# Patient Record
Sex: MALE | Race: WHITE | NOT HISPANIC OR LATINO
[De-identification: names, ages, dates, MRNs, and addresses within clinical notes are randomized per-mention and may not be internally consistent; named-entity substitution may affect disease eponyms.]

---

## 2019-11-11 ENCOUNTER — APPOINTMENT (OUTPATIENT)
Dept: ORTHOPEDIC SURGERY | Facility: CLINIC | Age: 24
End: 2019-11-11
Payer: COMMERCIAL

## 2019-11-11 VITALS
HEIGHT: 70 IN | HEART RATE: 88 BPM | DIASTOLIC BLOOD PRESSURE: 82 MMHG | SYSTOLIC BLOOD PRESSURE: 127 MMHG | BODY MASS INDEX: 31.21 KG/M2 | WEIGHT: 218 LBS

## 2019-11-11 DIAGNOSIS — F17.200 NICOTINE DEPENDENCE, UNSPECIFIED, UNCOMPLICATED: ICD-10-CM

## 2019-11-11 DIAGNOSIS — Z78.9 OTHER SPECIFIED HEALTH STATUS: ICD-10-CM

## 2019-11-11 PROBLEM — Z00.00 ENCOUNTER FOR PREVENTIVE HEALTH EXAMINATION: Status: ACTIVE | Noted: 2019-11-11

## 2019-11-11 PROCEDURE — 29125 APPL SHORT ARM SPLINT STATIC: CPT | Mod: LT

## 2019-11-11 PROCEDURE — 99203 OFFICE O/P NEW LOW 30 MIN: CPT | Mod: 25

## 2019-11-11 PROCEDURE — 73110 X-RAY EXAM OF WRIST: CPT | Mod: LT

## 2019-11-11 NOTE — DISCUSSION/SUMMARY
[FreeTextEntry1] : He has findings consistent with an acute left scaphoid waist fracture.  There is no obvious displacement on radiographs.\par \par I had a discussion regarding today's visit, the diagnosis, and treatment recommendations / options.  I discussed the inherent nature of scaphoid fractures with him and the risk of nonunion and avascular necrosis.  He is a smoker.  I discussed the benefits of surgery versus immobilization with a splint.  Unless absolutely necessary, he would like to avoid surgery.  He would like to try to see if this will heal in a cast.  He was placed into a fiberglass thumb spica splint, as he has too much swelling to mobilize him in a circular cast.  He was instructed on elevation and activity modification.  He will follow-up in 1 week for repeat x-rays and conversion to a cast.\par \par The patient has agreed to this plan of management and has expressed full understanding.  All questions were fully answered to the patient's satisfaction.\par \par Over 50% of the time spent with the patient was on counseling the patient on the above diagnosis, treatment plan and prognosis.
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
## 2019-11-11 NOTE — REVIEW OF SYSTEMS
[Right] : right [Negative] : Allergic/Immunologic [de-identified] : Complains of numbness and tingling in his left hand.

## 2019-11-11 NOTE — PHYSICAL EXAM
[de-identified] : PA, lateral, oblique and PA ulnar deviation views of his left wrist demonstrate a scaphoid waist fracture.  There is no displacement at the articular surface. [de-identified] : - Constitutional: This is a male in no obvious distress.  \par - Psych: Patient is alert and oriented to person, place and time.  Patient has a normal mood and affect.\par - Cardiovascular: Normal pulses throughout the upper extremities.  No significant varicosities are noted in the upper extremities. \par - Neuro: Strength and sensation are intact throughout the upper extremities.  Patient has normal coordination.\par - Respiratory:  Patient exhibits no evidence of shortness of breath or difficulty breathing.\par - Skin: No rashes, lesions, or other abnormalities are noted in the upper extremities.\par \par ---\par \par Examination of his left wrist and hand demonstrates diffuse swelling of the hand.  There is less notable swelling of the wrist.  He is diffusely tender with mild tenderness along the snuffbox.  He has limited motion of the wrist and digits.  He has complaints of numbness and tingling throughout the hand.  His fingers are warm to the touch.

## 2019-11-18 ENCOUNTER — APPOINTMENT (OUTPATIENT)
Dept: ORTHOPEDIC SURGERY | Facility: CLINIC | Age: 24
End: 2019-11-18
Payer: COMMERCIAL

## 2019-11-18 VITALS — BODY MASS INDEX: 31.21 KG/M2 | HEIGHT: 70 IN | WEIGHT: 218 LBS

## 2019-11-18 PROCEDURE — 29075 APPL CST ELBW FNGR SHORT ARM: CPT | Mod: LT

## 2019-11-18 PROCEDURE — 99214 OFFICE O/P EST MOD 30 MIN: CPT | Mod: 25

## 2019-11-18 PROCEDURE — 73110 X-RAY EXAM OF WRIST: CPT | Mod: LT

## 2019-11-18 NOTE — PHYSICAL EXAM
[de-identified] : - Constitutional: This is a male in no obvious distress.  \par - Psych: Patient is alert and oriented to person, place and time.  Patient has a normal mood and affect.\par - Cardiovascular: Normal pulses throughout the upper extremities.  No significant varicosities are noted in the upper extremities. \par - Neuro: Strength and sensation are intact throughout the upper extremities.  Patient has normal coordination.\par - Respiratory:  Patient exhibits no evidence of shortness of breath or difficulty breathing.\par - Skin: No rashes, lesions, or other abnormalities are noted in the upper extremities.\par \par ---\par \par Examination of his left wrist and hand after the splint was removed demonstrates decreased swelling.  He has tenderness along the snuffbox.  He has improved flexion and extension of the digits.  He is neurovascular intact distally today. [de-identified] : PA, lateral, oblique and PA ulnar deviation views of his left wrist demonstrate his scaphoid waist fracture to be well aligned, without interval displacement at the articular surface.

## 2019-11-18 NOTE — PROCEDURE
[FreeTextEntry1] : He was placed into a well-padded well molded left short arm fiberglass thumb spica cast.  He was instructed on cast care and activity modification.  He will follow-up after the CAT scan to review the results.

## 2019-11-18 NOTE — DISCUSSION/SUMMARY
[FreeTextEntry1] : I had a discussion regarding today's visit, the diagnosis, and my treatment recommendations.  Further treatment recommendations / options were discussed.  Again, he would like to avoid surgery unless absolutely necessary.  At this time, he has agreed to be placed in a cast.  I also recommended obtaining a CAT scan to confirm that the fracture is not displaced.  He is a smoker, and if there is any displacement, then I would recommend ORIF.  He will follow-up next week after the CAT scan to review the results and discuss definitive treatment recommendations.\par \par The patient has agreed to the above plan of management and has expressed full understanding.  All questions were fully answered to the patient's satisfaction.\par \par I spent at least 25 minutes of face-to-face time with the patient.  Over 50% of this time was spent on counseling the patient on the above diagnosis, treatment plan and prognosis.

## 2019-11-18 NOTE — REVIEW OF SYSTEMS
[Right] : right [Negative] : Allergic/Immunologic [de-identified] : Complains of numbness and tingling in his left hand.

## 2019-11-18 NOTE — HISTORY OF PRESENT ILLNESS
[FreeTextEntry1] : 9 days status post left scaphoid waist fracture.\par \par He is doing well and his swelling and pain are improved.

## 2019-11-25 ENCOUNTER — APPOINTMENT (OUTPATIENT)
Dept: ORTHOPEDIC SURGERY | Facility: CLINIC | Age: 24
End: 2019-11-25
Payer: COMMERCIAL

## 2019-11-25 VITALS — HEIGHT: 70 IN | WEIGHT: 218 LBS | BODY MASS INDEX: 31.21 KG/M2

## 2019-11-25 PROCEDURE — 99214 OFFICE O/P EST MOD 30 MIN: CPT

## 2019-11-25 NOTE — HISTORY OF PRESENT ILLNESS
[FreeTextEntry1] : 16 days status post left scaphoid waist fracture.\par \par He was seen in the office 1 week ago and he was casted.  He comes in to review his CAT scan.\par \par Otherwise, he is doing well.

## 2019-11-25 NOTE — REVIEW OF SYSTEMS
[Right] : right [Negative] : Endocrine [de-identified] : Complains of numbness and tingling in his left hand.

## 2019-11-25 NOTE — PHYSICAL EXAM
[de-identified] : - Constitutional: This is a male in no obvious distress.  \par - Psych: Patient is alert and oriented to person, place and time.  Patient has a normal mood and affect.\par - Cardiovascular: Normal pulses throughout the upper extremities.  No significant varicosities are noted in the upper extremities. \par - Neuro: Strength and sensation are intact throughout the upper extremities.  Patient has normal coordination.\par - Respiratory:  Patient exhibits no evidence of shortness of breath or difficulty breathing.\par - Skin: No rashes, lesions, or other abnormalities are noted in the upper extremities.\par \par ---\par \par Examination of his left wrist and hand demonstrates his cast to be well fitting.  He has full motion of the exposed digits..  He is neurovascular intact distally today. [de-identified] : I reviewed the CAT scan of his left wrist.  This demonstrated his scaphoid fracture to be well aligned on the coronal views.  However, on the sagittal views, there is displacement, translation in flexion at the fracture site.

## 2019-11-25 NOTE — DISCUSSION/SUMMARY
[FreeTextEntry1] : I reviewed the CAT scan results with him.  I had a discussion regarding today's visit, the diagnosis, and my treatment recommendations.  Further treatment recommendations / options were discussed.  Based upon the CAT scan, I did recommend ORIF.  There is obvious displacement and I reviewed the images with him.  He told me that he is not certain that he can schedule surgery, as he cannot afford to take time off of work.  He understands that he is at high risk for further displacement, nonunion, and loss of motion and possibly the development of a SNAC wrist, particularly as he is a smoker.  He told me that he understands this but he is not certain that he can commit to surgery at this time.  He would like to think about this further and discuss this with his family.  I did discuss the procedure with him as well is associated risks and benefits.  Again, he needs to think about this.  If he decides not to go ahead with surgery, then he will follow-up in 4 to 6 weeks.  Depending upon the radiographic findings at that time, I may repeat a CAT scan.  Again, he does understand that I have recommended surgery but he is not certain that he can proceed.\par \par -  The nature and purposes of the operation/procedure was discussed in detail.  I discussed the surgical procedure in detail, as well as expected postoperative recovery and outcome.\par -  Possible risks, benefits, and complications (from known and unknown causes) of the procedure were discussed in detail.  \par -  He was told that possible risks/complications include, but are not limited to:  Infection, nerve or vessel injury, stiffness, painful scar, hardware related complications including the need for removal of hardware in the future, poor outcome, need for additional surgical procedures, and other unforeseen complications.  \par -  In addition, the possibility of an "unsuccessful outcome," despite "successful surgery," was discussed with him.  Specifically, I discussed the potential of nonunion of the scaphoid fracture, even after successful ORIF.\par -  The patient fully understands these risks.  Again, as discussed above, at this time he cannot commit to scheduling the procedure.  He would like to think about this further and will discuss this with his family.\par -  I had a lengthy discussion with the patient regarding today's visit, the diagnosis, and my surgical treatment recommendations.  All questions were fully answered to the patient's satisfaction.\par \par I spent at least 25 minutes of face-to-face time with the patient.  Over 50% of this time was spent on counseling the patient on the above diagnosis, treatment plan and prognosis.

## 2019-12-19 ENCOUNTER — APPOINTMENT (OUTPATIENT)
Dept: ORTHOPEDIC SURGERY | Facility: CLINIC | Age: 24
End: 2019-12-19

## 2019-12-23 ENCOUNTER — APPOINTMENT (OUTPATIENT)
Dept: ORTHOPEDIC SURGERY | Facility: CLINIC | Age: 24
End: 2019-12-23

## 2020-01-09 PROBLEM — S62.002A FRACTURE OF SCAPHOID OF LEFT WRIST: Status: ACTIVE | Noted: 2019-11-11

## 2020-01-13 ENCOUNTER — APPOINTMENT (OUTPATIENT)
Dept: ORTHOPEDIC SURGERY | Facility: CLINIC | Age: 25
End: 2020-01-13

## 2020-01-13 DIAGNOSIS — S62.002A UNSPECIFIED FRACTURE OF NAVICULAR [SCAPHOID] BONE OF LEFT WRIST, INITIAL ENCOUNTER FOR CLOSED FRACTURE: ICD-10-CM

## 2021-12-23 ENCOUNTER — TRANSCRIPTION ENCOUNTER (OUTPATIENT)
Age: 26
End: 2021-12-23

## 2022-05-08 ENCOUNTER — EMERGENCY (EMERGENCY)
Facility: HOSPITAL | Age: 27
LOS: 1 days | Discharge: HOME | End: 2022-05-08
Admitting: EMERGENCY MEDICINE
Payer: SELF-PAY

## 2022-05-08 PROCEDURE — L9991: CPT

## 2022-05-10 DIAGNOSIS — R22.32 LOCALIZED SWELLING, MASS AND LUMP, LEFT UPPER LIMB: ICD-10-CM

## 2025-03-10 ENCOUNTER — APPOINTMENT (OUTPATIENT)
Dept: ORTHOPEDIC SURGERY | Facility: CLINIC | Age: 30
End: 2025-03-10

## 2025-03-12 ENCOUNTER — APPOINTMENT (OUTPATIENT)
Dept: ORTHOPEDIC SURGERY | Facility: CLINIC | Age: 30
End: 2025-03-12
Payer: COMMERCIAL

## 2025-03-12 VITALS — WEIGHT: 240 LBS | HEIGHT: 70 IN | BODY MASS INDEX: 34.36 KG/M2

## 2025-03-12 PROCEDURE — 99204 OFFICE O/P NEW MOD 45 MIN: CPT

## 2025-03-12 PROCEDURE — 73562 X-RAY EXAM OF KNEE 3: CPT | Mod: RT

## 2025-03-14 ENCOUNTER — RESULT REVIEW (OUTPATIENT)
Age: 30
End: 2025-03-14

## 2025-03-19 ENCOUNTER — APPOINTMENT (OUTPATIENT)
Dept: ORTHOPEDIC SURGERY | Facility: CLINIC | Age: 30
End: 2025-03-19
Payer: COMMERCIAL

## 2025-03-19 DIAGNOSIS — M23.91 UNSPECIFIED INTERNAL DERANGEMENT OF RIGHT KNEE: ICD-10-CM

## 2025-03-19 PROCEDURE — 99214 OFFICE O/P EST MOD 30 MIN: CPT

## 2025-09-21 ENCOUNTER — NON-APPOINTMENT (OUTPATIENT)
Age: 30
End: 2025-09-21